# Patient Record
Sex: FEMALE | Race: WHITE | URBAN - METROPOLITAN AREA
[De-identification: names, ages, dates, MRNs, and addresses within clinical notes are randomized per-mention and may not be internally consistent; named-entity substitution may affect disease eponyms.]

---

## 2017-12-27 ENCOUNTER — PAT TELEPHONE (OUTPATIENT)
Dept: PREADMISSION TESTING | Age: 57
End: 2017-12-27

## 2017-12-27 VITALS — BODY MASS INDEX: 43.16 KG/M2 | HEIGHT: 67 IN | WEIGHT: 275 LBS

## 2017-12-27 RX ORDER — ALBUTEROL SULFATE 90 UG/1
1-2 AEROSOL, METERED RESPIRATORY (INHALATION)
COMMUNITY
Start: 2016-02-29

## 2017-12-27 NOTE — PRE-PROCEDURE INSTRUCTIONS
C-Difficile admission screening and protocol:     * Admitted with diarrhea? no     *Prior history of C-Diff. In last 3 months? no     *Antibiotic use in the past 6-8 weeks? yes-sinus     *Prior hospitalization or nursing home in the last month?     no

## 2017-12-27 NOTE — PRE-PROCEDURE INSTRUCTIONS
[address] time___1030 per pt_________        Surgery time____1200________    Bella Lemus C-PAP    Hold all diabetic medication DOS    Take the following medications with a sip of water:wellbutrin ,lisinopril-htcz,synthroid and albuterolTake your inhaler as directed the DOS and bring with you DOS. Do not eat or drink anything after 12:00 midnight prior to your surgery. This includes water chewing gum, mints and ice chips. You may brush your teeth and gargle the morning of your surgery, but do not swallow the water     Please see your family doctor/pediatrician for a history and physical and/or concerning medications. Bring any test results/reports from your physicians office. If you are under the care of a heart doctor or specialist doctor, please be aware that you may be asked to them for clearance    You may be asked to stop blood thinners such as Coumadin, Plavix, Fragmin, Lovenox, etc., or any anti-inflammatories such as:  Aspirin, Ibuprofen, Advil, Naproxen prior to your surgery. We also ask that you stop any OTC medications such as fish oil, vitamin E, glucosamine, garlic, Multivitamins, COQ 10, etc.    We ask that you do not smoke 24 hours prior to surgery  We ask that you do not  drink any alcoholic beverages 24 hours prior to surgery     You must make arrangements for a responsible adult to take you home after your surgery. For your safety you will not be allowed to leave alone or drive yourself home. Your surgery will be cancelled if you do not have a ride home. Also for your safety, it is strongly suggested that someone stay with you the first 24 hours after your surgery. A parent or legal guardian must accompany a child scheduled for surgery and plan to stay at the hospital until the child is discharged. Please do not bring other children with you.     For your comfort, please wear simple loose fitting clothing to the hospital.  Please do not bring valuables. Do not wear any make-up or nail polish on your fingers or toes      For your safety, please do not wear any jewelry or body piercing's on the day of surgery. All jewelry must be removed. If you have dentures, they will be removed before going to operating room. For your convenience, we will provide you with a container. If you wear contact lenses or glasses, they will be removed, please bring a case for them. If you have a living will and a durable power of  for healthcare, please bring in a copy. As part of our patient safety program to minimize surgical site infections, we ask you to do the following:    · Please notify your surgeon if you develop any illness between         now and the  day of your surgery. · This includes a cough, cold, fever, sore throat, nausea,         or vomiting, and diarrhea, etc.  ·  Please notify your surgeon if you experience dizziness, shortness         of breath or blurred vision between now and the time of your surgery. Do not shave your operative site 96 hours prior to surgery. For face and neck surgery, men may use an electric razor 48 hours   prior to surgery. You may shower the night before surgery or the morning of   your surgery with an antibacterial soap. You will need to bring a photo ID and insurance card    [facility] has an onsite pharmacy, would you like to utilize our pharmacy     If you will be staying overnight and use a C-pap machine, please bring   your C-pap to hospital     Our goal is to provide you with excellent care, therefore, visitors will be limited to two(2) in the room at a time so that we may focus on providing this care for you. Please contact pre-admission testing if you have any further questions.                  [facility] phone number:  [phone] PAT fax number:  [phone]  Please note these are generalized instructions for all surgical cases, you may be

## 2017-12-29 ENCOUNTER — HOSPITAL ENCOUNTER (OUTPATIENT)
Dept: SURGERY | Age: 57
Discharge: HOME | End: 2017-12-29
Attending: PODIATRIST | Admitting: PODIATRIST

## 2017-12-29 VITALS
BODY MASS INDEX: 43.16 KG/M2 | HEART RATE: 62 BPM | HEIGHT: 67 IN | OXYGEN SATURATION: 96 % | SYSTOLIC BLOOD PRESSURE: 122 MMHG | WEIGHT: 275 LBS | DIASTOLIC BLOOD PRESSURE: 69 MMHG | TEMPERATURE: 96.8 F | RESPIRATION RATE: 18 BRPM

## 2017-12-29 DIAGNOSIS — G89.18 ACUTE POST-OPERATIVE PAIN: Primary | ICD-10-CM

## 2017-12-29 LAB
GLUCOSE BLD-MCNC: 120 MG/DL (ref 70–99)
PERFORMED ON: ABNORMAL

## 2017-12-29 PROCEDURE — 93010 ELECTROCARDIOGRAM REPORT: CPT | Performed by: INTERNAL MEDICINE

## 2017-12-29 RX ORDER — OXYCODONE HYDROCHLORIDE AND ACETAMINOPHEN 5; 325 MG/1; MG/1
1 TABLET ORAL EVERY 6 HOURS PRN
Qty: 25 TABLET | Refills: 0 | Status: SHIPPED | OUTPATIENT
Start: 2017-12-29 | End: 2018-01-01

## 2017-12-29 RX ORDER — SODIUM CHLORIDE 9 MG/ML
INJECTION, SOLUTION INTRAVENOUS CONTINUOUS
Status: DISCONTINUED | OUTPATIENT
Start: 2017-12-29 | End: 2017-12-30 | Stop reason: HOSPADM

## 2017-12-29 RX ORDER — FENTANYL CITRATE 50 UG/ML
25 INJECTION, SOLUTION INTRAMUSCULAR; INTRAVENOUS EVERY 5 MIN PRN
Status: DISCONTINUED | OUTPATIENT
Start: 2017-12-29 | End: 2017-12-30 | Stop reason: HOSPADM

## 2017-12-29 RX ORDER — OXYCODONE HYDROCHLORIDE AND ACETAMINOPHEN 5; 325 MG/1; MG/1
2 TABLET ORAL PRN
Status: ACTIVE | OUTPATIENT
Start: 2017-12-29 | End: 2017-12-29

## 2017-12-29 RX ORDER — ONDANSETRON 2 MG/ML
4 INJECTION INTRAMUSCULAR; INTRAVENOUS
Status: ACTIVE | OUTPATIENT
Start: 2017-12-29 | End: 2017-12-29

## 2017-12-29 RX ORDER — MORPHINE SULFATE 2 MG/ML
2 INJECTION, SOLUTION INTRAMUSCULAR; INTRAVENOUS EVERY 5 MIN PRN
Status: DISCONTINUED | OUTPATIENT
Start: 2017-12-29 | End: 2017-12-30 | Stop reason: HOSPADM

## 2017-12-29 RX ORDER — FENTANYL CITRATE 50 UG/ML
50 INJECTION, SOLUTION INTRAMUSCULAR; INTRAVENOUS EVERY 5 MIN PRN
Status: DISCONTINUED | OUTPATIENT
Start: 2017-12-29 | End: 2017-12-30 | Stop reason: HOSPADM

## 2017-12-29 RX ORDER — SODIUM CHLORIDE 0.9 % (FLUSH) 0.9 %
10 SYRINGE (ML) INJECTION PRN
Status: DISCONTINUED | OUTPATIENT
Start: 2017-12-29 | End: 2017-12-30 | Stop reason: HOSPADM

## 2017-12-29 RX ORDER — MORPHINE SULFATE 2 MG/ML
1 INJECTION, SOLUTION INTRAMUSCULAR; INTRAVENOUS EVERY 5 MIN PRN
Status: DISCONTINUED | OUTPATIENT
Start: 2017-12-29 | End: 2017-12-30 | Stop reason: HOSPADM

## 2017-12-29 RX ORDER — SODIUM CHLORIDE 0.9 % (FLUSH) 0.9 %
10 SYRINGE (ML) INJECTION EVERY 12 HOURS SCHEDULED
Status: DISCONTINUED | OUTPATIENT
Start: 2017-12-29 | End: 2017-12-30 | Stop reason: HOSPADM

## 2017-12-29 RX ORDER — OXYCODONE HYDROCHLORIDE AND ACETAMINOPHEN 5; 325 MG/1; MG/1
1 TABLET ORAL PRN
Status: ACTIVE | OUTPATIENT
Start: 2017-12-29 | End: 2017-12-29

## 2017-12-29 RX ORDER — MEPERIDINE HYDROCHLORIDE 25 MG/ML
12.5 INJECTION INTRAMUSCULAR; INTRAVENOUS; SUBCUTANEOUS EVERY 5 MIN PRN
Status: DISCONTINUED | OUTPATIENT
Start: 2017-12-29 | End: 2017-12-30 | Stop reason: HOSPADM

## 2017-12-29 RX ADMIN — SODIUM CHLORIDE: 9 INJECTION, SOLUTION INTRAVENOUS at 11:11

## 2017-12-29 ASSESSMENT — PAIN SCALES - GENERAL
PAINLEVEL_OUTOF10: 0

## 2017-12-29 ASSESSMENT — LIFESTYLE VARIABLES: SMOKING_STATUS: 0

## 2017-12-29 ASSESSMENT — PAIN DESCRIPTION - LOCATION: LOCATION: FOOT

## 2017-12-29 ASSESSMENT — PAIN DESCRIPTION - PAIN TYPE: TYPE: SURGICAL PAIN

## 2017-12-29 ASSESSMENT — PAIN DESCRIPTION - ORIENTATION: ORIENTATION: LEFT

## 2017-12-29 ASSESSMENT — PAIN - FUNCTIONAL ASSESSMENT: PAIN_FUNCTIONAL_ASSESSMENT: 0-10

## 2017-12-29 NOTE — OP NOTE
Operative Note    Malcolm Veloz  YOB: 1960  MRN: [id number]  DOS: 12/29/2017    Surgeon: Dr. Elisa Cunha, DPM    Assistants: Marie Tineo, PGY-3    Pre-operative Diagnosis:   1. Hammertoe deformity 2nd digit, left  2. Metatarsalgia, left  3. Pain in foot, left    Post-operative Diagnosis: Same    Procedure:   1. Hammertoe correction 2nd digit, left  2. Weil osteotomy, left    Anesthesia: General and Local    Hemostasis: ankle tourniquet at 250 mmHg    Estimated Blood Loss: less than 50 cc    Materials:   [employer] Phalinx hammertoe implant with 0.045 wire  [employer] 2.5x12 mm screw  3-0, 4-0 vicryl; 4-0 monocryl; 4-0 nylon    Injectables: Pre: 10 cc 2% polocaine plain ; Post: 10 cc 0.5% marcaine plain with 1 cc decadron    Complications: None    Specimens: Was Not Obtained    Findings: as dictated     INDICATIONS FOR PROCEDURE: This patient has signs and symptoms clinically consistent with the above mentioned preoperative diagnosis. Having failed conservative treatment, it was determined that the patient would benefit from surgical intervention. All potential risks, benefits, and complications were discussed with the patient prior to the scheduling of surgery. The patient wished to proceed with surgery, and informed written consent was obtained. DETAILS OF PROCEDURE: The patient was brought from the pre-operative area and placed on the operating table in the supine position. A pneumatic ankle tourniquet was placed around the patient's well-padded left ankle. At this time a time out was taken to identify the patient, allergies, time of prophylactic antibiotic administration, and operative side and site. A local anesthetic block consisting of 10 cc 2% polocaine plain was then injected proximal to the incision site. The left  lower extremity was then scrubbed, prepped, and draped in the usual sterile fashion.  An Esmarch bandage was then utilized to exsanguinate the patient's left lower extremity. The tourniquet was then inflated to 250 mmHg. Details of Procedure: Hammertoe correction 2nd digit, left    At this time, attention was directed towards the dorsal aspect of the patients 2nd digit. Using a # 15 blade an incision was performed over the 2nd MPJ and PIPJ. The incision was deepened down through the subcutaneous tissues using sharp and blunt dissection techniques. All vital neurovascular structures were carefully retracted and all venous tributaries were cauterized as encountered. Dissection continued down to the level of the extensor digitorum longus tendon and capsule of the proximal interphalangeal joint of the 2nd digit. A transverse tenotomy and capsulotomy was created and the medial and lateral collateral ligaments were freed over the head of the proximal phalanx of the 2nd digit. This tendon was reflected back for proper visualization of the arthrodesis site. Attention was directed towards the dorsal, medial, and lateral aspects of the patient's middle phalangeal base of the the 2nd digit and the collateral ligaments and extensor tendon was freed from the base and reflected. At this time, an oscillating saw was utilized to transect the the head of the proximal phalanx just proximal to the epicondyles from dorsal to plantar, medial to lateral, and through and through. The transected bone was extubated in total from the surgical site after being freed from all soft tissue attachments. Attention was then directed towards the base of the middle phalanx of the 2nd digit where the cartilage was resected until subchondral plate was seen. Upon completion, the two bones were coapted and great apposition of bone was noted. Next, the k-wire from the implant set was driven from the middle phalanx out the tip of the toe to the black laser line. Utilizing the [facility] Phalinx drill bit, countersink, and tap, the holes were made for the middle phalanx and the proximal phalanx.  The Phalinx implant was then implanted in the head of the proximal and the base of the middle phalanx and the K-wire was advanced into the head of the 2nd metatarsal. Good bone coaptation without any osseous bridging or gapping at the arthrodesis site was confirmed with direct visualization and palpation. Upon completion, weightbearing was stimulated and it was noted that there was adequate correction of the above-mentioned deformity. The surgical site was then irrigated with sterile saline. The extensor tendon was repaired with 3-0 vicryl. The subcutaneous tissues were re-approximated using 4-0 vicryl and the skin was re-approximated using 4-0 nylon. Details of Procedure: Weil osteotomy of 2nd metatarsal, left  The incision over the 2nd MPJ was deepened through the subcutaneous layer with care being taken to identify and retract all vital neurovascular structures. All venous tributaries were electrocoagulated as encountered. Sharp and blunt dissection continued to the level of the metatarsophalangeal joint. Using a #15 blade, the dorsal, medial and lateral aspects of the metatarsophalangeal joint capsule were transected, thereby releasing the capsule. A weightbearing attitude of the joint was then simulated utilizing the Kelikian push-up test, performed by placing a dorsiflexory force on the plantar aspect of the metatarsal head. The previously noted dorsal contract was further reduced. Attention was then directed to the notably elongated metatarsal, and attention was directed to the osteotomy. Using a #15 blade, a capsular and periosteal incision was then made linearly over the 2nd metatarsal head. The capsule and periosteal structures were meticulously reflected both dorsally, medially and laterally until excellent soft tissue exposure had been achieved. Attention was then directed towards the creation of the osteotomy.   Beginning approximately 2 mm into the articular cartilage from dorsodistal to proximoplantar, a through and through osteotomy of the second metatarsal was created, parallel to the weight-bearing surface. Upon completion of the osteotomy, the head of the metatarsal was transposed 3 mm proximally. The osteotomy was temporarily fixated utilizing a guide wire from the [facility] set. Next, the osteotomy was fixated with a cannulated 2.5 x 12mm compression screw according to manufacture's insertion technique. Excellent compression and restoration of the metatarsal parabola was observed. The guide wire was removed and excellent bone to bone apposition was noted at the osteotomy. At this time, the 2nd digit was noted to be slightly dorsiflexed secondary to the tension of the long extensor tendon. In efforts of eliminating deforming forces, a Z-plasty tendon lengthening was performed using a #15 blade. The extensor tendon was incised at the midline longitudinally approximately 2 cm in length with two tasha-section tenotomies placed medial and lateral at opposite ends of each other. This allowed for the tendon to slide and lengthen on itself. The extensor tendon was then re-approximated and found to have adequate length for repair, absent of any deforming forces. The wound was then irrigated with sterile saline. The lengthening tendon and periosteal tissues were then re-approximated using 3-0 Vicryl suture. The subcutaneous layer was re-approximated using 4-0 Vicryl suture. The skin was then re-approximated using 4-0 monocryl in a running subcuticular fashion. END OF PROCEDURE: At this time, a local anesthetic was injected in a regional block fashion about the patients surgical sites for the patients postoperative comfort and soft dry sterile dressing was applied. The pneumatic ankle tourniquet was rapidly deflated and a prompt instantaneous hyperemic response was noted on all aspects of the patients left lower extremity. The patient tolerated the procedure and anesthesia well.   The patient was transported from the OR to the PACU with vital signs stable and vascular status intact to all digits of the left foot. Following a short period of post-operative monitoring the patient is to be discharged home.         Dictated for Dr. Reynaldo Harrison, PGY-3  Pager: [phone]

## 2017-12-29 NOTE — ANESTHESIA PRE-OP
Department of Anesthesiology  Preprocedure Note       Name:  Nakia Rosario   Age:  61 y.o.  :  1960                                          MRN:  [id number]         Date:  2017      [facility] Department of Anesthesiology  Pre-Anesthesia Evaluation/Consultation       Name:  Nakia Rosario                                         Age:  61 y.o. MRN:  [id number]           Procedure (Scheduled):  L foot  Surgeon:  Dr. Ana María Gray and Other (See Comments)     Due to family hx of [id number]     There is no problem list on file for this patient. Past Medical History:   Diagnosis Date    Back disorder     See 17 pre-op H&P. ...pt denies    Depression     Diabetes mellitus ([address]     pre-diabetic and diet trcontrolled    Hyperlipidemia     Hypertension     IBS (irritable bowel syndrome)     hx of    Sinus infection     Sleep apnea     uses c-pap    Thyroid disease      Past Surgical History:   Procedure Laterality Date    CARPAL TUNNEL RELEASE Bilateral     CHOLECYSTECTOMY      COLONOSCOPY      FOOT SURGERY Right 2016    Distal MT osteotomy 2nd R 2.  PIPJ arthrodesis with Phalanx implant 2nd digit R    HYSTERECTOMY      total     Social History   Substance Use Topics    Smoking status: Never Smoker    Smokeless tobacco: Never Used    Alcohol use Yes      Comment: occas     Medications  Current Outpatient Prescriptions on File Prior to Encounter   Medication Sig Dispense Refill    albuterol sulfate HFA (VENTOLIN HFA) 108 (90 Base) MCG/ACT inhaler Inhale 1-2 puffs into the lungs      buPROPion (WELLBUTRIN SR) 150 MG extended release tablet Take 150 mg by mouth 2 times daily      montelukast (SINGULAIR) 10 MG tablet Take 10 mg by mouth nightly      levothyroxine (SYNTHROID) 100 MCG tablet Take 100 mcg by mouth Daily      lisinopril-hydrochlorothiazide (PRINZIDE;ZESTORETIC) 20-12.5 MG per tablet Take 1 tablet by mouth 2 Take 1 tablet by mouth daily    Historical Provider, MD   Calcium Carb-Cholecalciferol (CALCIUM + D3 PO) Take 1 tablet by mouth daily    Historical Provider, MD       Current medications:    Current Outpatient Prescriptions   Medication Sig Dispense Refill    albuterol sulfate HFA (VENTOLIN HFA) 108 (90 Base) MCG/ACT inhaler Inhale 1-2 puffs into the lungs      buPROPion (WELLBUTRIN SR) 150 MG extended release tablet Take 150 mg by mouth 2 times daily      montelukast (SINGULAIR) 10 MG tablet Take 10 mg by mouth nightly      levothyroxine (SYNTHROID) 100 MCG tablet Take 100 mcg by mouth Daily      lisinopril-hydrochlorothiazide (PRINZIDE;ZESTORETIC) 20-12.5 MG per tablet Take 1 tablet by mouth 2 times daily      simvastatin (ZOCOR) 40 MG tablet Take 40 mg by mouth nightly      loratadine (ALLERGY RELIEF) 10 MG tablet Take 10 mg by mouth as needed [employer] brand       Multiple Vitamins-Minerals (MULTIVITAMIN ADULT PO) Take 1 tablet by mouth daily      Calcium Carb-Cholecalciferol (CALCIUM + D3 PO) Take 1 tablet by mouth daily       No current facility-administered medications for this encounter. Allergies: Allergies   Allergen Reactions    Sulfa Antibiotics Hives and Other (See Comments)     Due to family hx of [id number]       Problem List:  There is no problem list on file for this patient. Past Medical History:        Diagnosis Date    Back disorder     See 12/19/17 pre-op H&P. ...pt denies    Depression     Diabetes mellitus ([address]     pre-diabetic and diet trcontrolled    Hyperlipidemia     Hypertension     IBS (irritable bowel syndrome)     hx of    Sinus infection     Sleep apnea     uses c-pap    Thyroid disease        Past Surgical History:        Procedure Laterality Date    CARPAL TUNNEL RELEASE Bilateral     CHOLECYSTECTOMY      COLONOSCOPY      FOOT SURGERY Right 12/30/2016    Distal MT osteotomy 2nd R 2.  PIPJ arthrodesis with Phalanx implant 2nd digit R    HYSTERECTOMY      total       Social History:    Social History   Substance Use Topics    Smoking status: Never Smoker    Smokeless tobacco: Never Used    Alcohol use Yes      Comment: occas                                Counseling given: Not Answered      Vital Signs (Current): There were no vitals filed for this visit. BP Readings from Last 3 Encounters:   12/30/16 (!) 149/63       NPO Status:  mn+, see mar                                                                               BMI:   Wt Readings from Last 3 Encounters:   12/27/17 275 lb (124.7 kg)   12/29/16 280 lb (127 kg)     There is no height or weight on file to calculate BMI. Anesthesia Evaluation  Patient summary reviewed no history of anesthetic complications:   Airway: Mallampati: III  TM distance: <3 FB   Neck ROM: limited  Mouth opening: > = 3 FB Dental:          Pulmonary: breath sounds clear to auscultation  (+) sleep apnea: on CPAP,      (-) not a current smoker                           Cardiovascular:    (+) hypertension:, hyperlipidemia      ECG reviewed  Rhythm: regular  Rate: normal           Beta Blocker:  Not on Beta Blocker         Neuro/Psych:   Negative Neuro/Psych ROS  (+) depression/anxiety  (dep.)            GI/Hepatic/Renal: Neg GI/Hepatic/Renal ROS  (+) morbid obesity          Endo/Other:    (+) Type II DM (no meds), , hypothyroidism: arthritis:., .                 Abdominal:   (+) obese,     Abdomen: soft. Vascular: negative vascular ROS. Anesthesia Plan      TIVA     ASA 3       Induction: intravenous. MIPS: Postoperative opioids intended and Prophylactic antiemetics administered. Anesthetic plan and risks discussed with spouse and patient. Plan discussed with CRNA.               This pre-anesthesia assessment may be used as a history and physical.    DOS STAFF ADDENDUM:    Pt seen and examined, chart reviewed (including anesthesia, drug and allergy history). No interval changes to history and physical examination. Anesthetic plan, risks, benefits, alternatives, and personnel involved discussed with patient. Patient verbalized an understanding and agrees to proceed.       Jose Aiken MD  December 29, 2017  10:47 AM      Jose Aiken MD   12/29/2017

## 2017-12-29 NOTE — BRIEF OP NOTE
Brief Postoperative Note    Isabel Aiken  YOB: 1960  MRN: [id number]  DOS: 12/29/2017    Surgeon: Dr. Lonnie Reese DPM    Assistants: Dionicio Calloway, PGY-3    Pre-operative Diagnosis:   1. Hammertoe deformity 2nd digit, left  2. Metatarsalgia, left  3. Pain in foot, left    Post-operative Diagnosis: Same    Procedure:   1. Hammertoe correction 2nd digit, left  2. Weil osteotomy, left    Anesthesia: General and Local    Hemostasis: ankle tourniquet at 250 mmHg    Estimated Blood Loss: less than 50 cc    Materials:   [employer] Phalinx hammertoe implant with 0.045 wire  [employer] 2.5x12 mm screw  3-0, 4-0 vicryl; 4-0 monocryl; 4-0 nylon    Injectables: Pre: 10 cc 2% polocaine plain ; Post: 10 cc 0.5% marcaine plain with 1 cc decadron    Complications: None    Specimens: Was Not Obtained    Findings: as dictated     The patient tolerated the procedure and anesthesia well and was transported from the operating room to the PACU with vital signs stable and vascular status intact to all aspects of the patient's left lower extremity and digital capillary refill time immediate to the digits of the left  foot. Following a period of post-operative monitoring, the patient will be discharged home with written and oral wound care and follow-up instructions per Dr. Ivana Callahan. The patient is to follow-up with Dr. Ivana Callahan in his private office within 3-5 days. The patient is to keep dressing clean, dry and intact at all times. The patient is to call with if any complications occur.     Dictated for Cheyenne Hua DPM, PGY-3  Pager: [phone]

## 2017-12-29 NOTE — PROGRESS NOTES
1.Verify patient  2. Orientation surroundings  3. Confirms consent  4. Assess anxiety  5. Maintain core temperature 96.8 or above  6. Patient safety call light in reach side rail(s) up.  7. IV infusing without problems. 8.Assess increasing levels of pain. 9.Monitor for nausea. 10.Follow standard of cares.

## 2017-12-29 NOTE — ANESTHESIA POST-OP
[facility] Department of Anesthesiology  Post-Anesthesia Note       Name:  Sebastian Richardton                                         Age:  61 y.o.   MRN:  [id number]     Last Vitals & Oxygen Saturation: /69   Pulse 62   Temp 96.8 °F (36 °C) (Temporal)   Resp 18   Ht 5' 7\" (1.702 m)   Wt 275 lb (124.7 kg)   LMP 09/20/2016   SpO2 96%   BMI 43.07 kg/m²   Patient Vitals for the past 4 hrs:   BP Temp Temp src Pulse Resp SpO2   12/29/17 1613 122/69 - - 62 18 96 %   12/29/17 1545 114/70 96.8 °F (36 °C) Temporal 55 18 96 %   12/29/17 1530 138/82 - - 58 21 100 %   12/29/17 1525 - 97.1 °F (36.2 °C) - 60 17 96 %   12/29/17 1515 (!) 133/92 - - 61 15 98 %   12/29/17 1500 113/71 - - 68 16 100 %   12/29/17 1455 (!) 107/58 - - 66 17 99 %   12/29/17 1453 - - - 63 - -   12/29/17 1450 93/64 - - 64 17 99 %   12/29/17 1445 111/64 - - 70 16 96 %   12/29/17 1443 122/63 97.3 °F (36.3 °C) Temporal 70 19 96 %       Level of consciousness: awake, alert and oriented    Respiratory: stable     Cardiovascular: stable     Hydration: stable     PONV: stable     Post-op pain: adequate analgesia    Post-op assessment: no apparent anesthetic complications and tolerated procedure well    Complications:  none    Armando Russo MD  December 29, 2017   4:24 PM

## 2017-12-29 NOTE — PROGRESS NOTES
O2 off. Pt awake. No complaints at present. Numbness to left foot but able to feel pressure and move toes.

## 2017-12-30 LAB
EKG ATRIAL RATE: 69 BPM
EKG DIAGNOSIS: NORMAL
EKG P AXIS: 2 DEGREES
EKG P-R INTERVAL: 158 MS
EKG Q-T INTERVAL: 448 MS
EKG QRS DURATION: 102 MS
EKG QTC CALCULATION (BAZETT): 480 MS
EKG R AXIS: -12 DEGREES
EKG T AXIS: 4 DEGREES
EKG VENTRICULAR RATE: 69 BPM

## 2018-01-05 NOTE — INTERVAL H&P NOTE
I have reviewed the patient's medical history in detail; there are no changes to the history as noted in the electronic medical record.